# Patient Record
Sex: MALE | Race: WHITE | NOT HISPANIC OR LATINO | URBAN - METROPOLITAN AREA
[De-identification: names, ages, dates, MRNs, and addresses within clinical notes are randomized per-mention and may not be internally consistent; named-entity substitution may affect disease eponyms.]

---

## 2018-12-08 ENCOUNTER — INPATIENT (INPATIENT)
Facility: HOSPITAL | Age: 73
LOS: 1 days | Discharge: HOME | DRG: 311 | End: 2018-12-10
Attending: HOSPITALIST | Admitting: HOSPITALIST
Payer: MEDICARE

## 2018-12-08 VITALS
WEIGHT: 184.97 LBS | RESPIRATION RATE: 18 BRPM | TEMPERATURE: 98 F | HEIGHT: 63 IN | SYSTOLIC BLOOD PRESSURE: 148 MMHG | OXYGEN SATURATION: 97 % | HEART RATE: 79 BPM | DIASTOLIC BLOOD PRESSURE: 85 MMHG

## 2018-12-08 DIAGNOSIS — Z29.9 ENCOUNTER FOR PROPHYLACTIC MEASURES, UNSPECIFIED: ICD-10-CM

## 2018-12-08 DIAGNOSIS — I24.9 ACUTE ISCHEMIC HEART DISEASE, UNSPECIFIED: ICD-10-CM

## 2018-12-08 DIAGNOSIS — K21.9 GASTRO-ESOPHAGEAL REFLUX DISEASE WITHOUT ESOPHAGITIS: ICD-10-CM

## 2018-12-08 DIAGNOSIS — I10 ESSENTIAL (PRIMARY) HYPERTENSION: ICD-10-CM

## 2018-12-08 DIAGNOSIS — R07.9 CHEST PAIN, UNSPECIFIED: ICD-10-CM

## 2018-12-08 LAB
ALBUMIN SERPL ELPH-MCNC: 3.9 G/DL — SIGNIFICANT CHANGE UP (ref 3.5–5)
ALP SERPL-CCNC: 62 U/L — SIGNIFICANT CHANGE UP (ref 40–120)
ALT FLD-CCNC: 29 U/L DA — SIGNIFICANT CHANGE UP (ref 10–60)
ANION GAP SERPL CALC-SCNC: 7 MMOL/L — SIGNIFICANT CHANGE UP (ref 5–17)
APTT BLD: 30 SEC — SIGNIFICANT CHANGE UP (ref 27.5–36.3)
AST SERPL-CCNC: 19 U/L — SIGNIFICANT CHANGE UP (ref 10–40)
BASOPHILS # BLD AUTO: 0.1 K/UL — SIGNIFICANT CHANGE UP (ref 0–0.2)
BASOPHILS NFR BLD AUTO: 0.7 % — SIGNIFICANT CHANGE UP (ref 0–2)
BILIRUB SERPL-MCNC: 0.8 MG/DL — SIGNIFICANT CHANGE UP (ref 0.2–1.2)
BUN SERPL-MCNC: 19 MG/DL — HIGH (ref 7–18)
CALCIUM SERPL-MCNC: 8.9 MG/DL — SIGNIFICANT CHANGE UP (ref 8.4–10.5)
CHLORIDE SERPL-SCNC: 102 MMOL/L — SIGNIFICANT CHANGE UP (ref 96–108)
CK MB BLD-MCNC: 1.7 % — SIGNIFICANT CHANGE UP (ref 0–3.5)
CK MB CFR SERPL CALC: 1.1 NG/ML — SIGNIFICANT CHANGE UP (ref 0–3.6)
CK SERPL-CCNC: 65 U/L — SIGNIFICANT CHANGE UP (ref 35–232)
CO2 SERPL-SCNC: 26 MMOL/L — SIGNIFICANT CHANGE UP (ref 22–31)
CREAT SERPL-MCNC: 0.74 MG/DL — SIGNIFICANT CHANGE UP (ref 0.5–1.3)
D DIMER BLD IA.RAPID-MCNC: 190 NG/ML DDU — SIGNIFICANT CHANGE UP
EOSINOPHIL # BLD AUTO: 0 K/UL — SIGNIFICANT CHANGE UP (ref 0–0.5)
EOSINOPHIL NFR BLD AUTO: 0.2 % — SIGNIFICANT CHANGE UP (ref 0–6)
GLUCOSE SERPL-MCNC: 94 MG/DL — SIGNIFICANT CHANGE UP (ref 70–99)
HCT VFR BLD CALC: 52.6 % — HIGH (ref 39–50)
HGB BLD-MCNC: 16.4 G/DL — SIGNIFICANT CHANGE UP (ref 13–17)
INR BLD: 1 RATIO — SIGNIFICANT CHANGE UP (ref 0.88–1.16)
LYMPHOCYTES # BLD AUTO: 1.1 K/UL — SIGNIFICANT CHANGE UP (ref 1–3.3)
LYMPHOCYTES # BLD AUTO: 12.5 % — LOW (ref 13–44)
MCHC RBC-ENTMCNC: 29.1 PG — SIGNIFICANT CHANGE UP (ref 27–34)
MCHC RBC-ENTMCNC: 31.2 GM/DL — LOW (ref 32–36)
MCV RBC AUTO: 93.1 FL — SIGNIFICANT CHANGE UP (ref 80–100)
MONOCYTES # BLD AUTO: 0.5 K/UL — SIGNIFICANT CHANGE UP (ref 0–0.9)
MONOCYTES NFR BLD AUTO: 5.9 % — SIGNIFICANT CHANGE UP (ref 2–14)
NEUTROPHILS # BLD AUTO: 7.3 K/UL — SIGNIFICANT CHANGE UP (ref 1.8–7.4)
NEUTROPHILS NFR BLD AUTO: 80.6 % — HIGH (ref 43–77)
PLATELET # BLD AUTO: 213 K/UL — SIGNIFICANT CHANGE UP (ref 150–400)
POTASSIUM SERPL-MCNC: 4.3 MMOL/L — SIGNIFICANT CHANGE UP (ref 3.5–5.3)
POTASSIUM SERPL-SCNC: 4.3 MMOL/L — SIGNIFICANT CHANGE UP (ref 3.5–5.3)
PROT SERPL-MCNC: 8.3 G/DL — SIGNIFICANT CHANGE UP (ref 6–8.3)
PROTHROM AB SERPL-ACNC: 11.1 SEC — SIGNIFICANT CHANGE UP (ref 10–12.9)
RBC # BLD: 5.65 M/UL — SIGNIFICANT CHANGE UP (ref 4.2–5.8)
RBC # FLD: 12.7 % — SIGNIFICANT CHANGE UP (ref 10.3–14.5)
SODIUM SERPL-SCNC: 135 MMOL/L — SIGNIFICANT CHANGE UP (ref 135–145)
TROPONIN I SERPL-MCNC: <0.015 NG/ML — SIGNIFICANT CHANGE UP (ref 0–0.04)
TROPONIN I SERPL-MCNC: <0.015 NG/ML — SIGNIFICANT CHANGE UP (ref 0–0.04)
WBC # BLD: 9.1 K/UL — SIGNIFICANT CHANGE UP (ref 3.8–10.5)
WBC # FLD AUTO: 9.1 K/UL — SIGNIFICANT CHANGE UP (ref 3.8–10.5)

## 2018-12-08 PROCEDURE — 71045 X-RAY EXAM CHEST 1 VIEW: CPT | Mod: 26

## 2018-12-08 PROCEDURE — 99285 EMERGENCY DEPT VISIT HI MDM: CPT

## 2018-12-08 RX ORDER — DOCUSATE SODIUM 100 MG
100 CAPSULE ORAL
Qty: 0 | Refills: 0 | Status: DISCONTINUED | OUTPATIENT
Start: 2018-12-08 | End: 2018-12-10

## 2018-12-08 RX ORDER — METOPROLOL TARTRATE 50 MG
12.5 TABLET ORAL
Qty: 0 | Refills: 0 | Status: DISCONTINUED | OUTPATIENT
Start: 2018-12-08 | End: 2018-12-10

## 2018-12-08 RX ORDER — PANTOPRAZOLE SODIUM 20 MG/1
40 TABLET, DELAYED RELEASE ORAL
Qty: 0 | Refills: 0 | Status: DISCONTINUED | OUTPATIENT
Start: 2018-12-08 | End: 2018-12-10

## 2018-12-08 RX ORDER — SENNA PLUS 8.6 MG/1
2 TABLET ORAL AT BEDTIME
Qty: 0 | Refills: 0 | Status: DISCONTINUED | OUTPATIENT
Start: 2018-12-08 | End: 2018-12-10

## 2018-12-08 RX ORDER — ENOXAPARIN SODIUM 100 MG/ML
40 INJECTION SUBCUTANEOUS DAILY
Qty: 0 | Refills: 0 | Status: DISCONTINUED | OUTPATIENT
Start: 2018-12-08 | End: 2018-12-10

## 2018-12-08 RX ORDER — SIMVASTATIN 20 MG/1
40 TABLET, FILM COATED ORAL AT BEDTIME
Qty: 0 | Refills: 0 | Status: DISCONTINUED | OUTPATIENT
Start: 2018-12-08 | End: 2018-12-10

## 2018-12-08 RX ORDER — SODIUM CHLORIDE 9 MG/ML
3 INJECTION INTRAMUSCULAR; INTRAVENOUS; SUBCUTANEOUS ONCE
Qty: 0 | Refills: 0 | Status: COMPLETED | OUTPATIENT
Start: 2018-12-08 | End: 2018-12-08

## 2018-12-08 RX ORDER — ASPIRIN/CALCIUM CARB/MAGNESIUM 324 MG
81 TABLET ORAL DAILY
Qty: 0 | Refills: 0 | Status: DISCONTINUED | OUTPATIENT
Start: 2018-12-08 | End: 2018-12-10

## 2018-12-08 RX ORDER — LOSARTAN POTASSIUM 100 MG/1
100 TABLET, FILM COATED ORAL DAILY
Qty: 0 | Refills: 0 | Status: DISCONTINUED | OUTPATIENT
Start: 2018-12-08 | End: 2018-12-10

## 2018-12-08 RX ADMIN — SODIUM CHLORIDE 3 MILLILITER(S): 9 INJECTION INTRAMUSCULAR; INTRAVENOUS; SUBCUTANEOUS at 09:29

## 2018-12-08 RX ADMIN — Medication 81 MILLIGRAM(S): at 22:05

## 2018-12-08 RX ADMIN — ENOXAPARIN SODIUM 40 MILLIGRAM(S): 100 INJECTION SUBCUTANEOUS at 22:02

## 2018-12-08 RX ADMIN — SIMVASTATIN 40 MILLIGRAM(S): 20 TABLET, FILM COATED ORAL at 22:05

## 2018-12-08 RX ADMIN — Medication 100 MILLIGRAM(S): at 17:22

## 2018-12-08 RX ADMIN — PANTOPRAZOLE SODIUM 40 MILLIGRAM(S): 20 TABLET, DELAYED RELEASE ORAL at 22:05

## 2018-12-08 RX ADMIN — Medication 12.5 MILLIGRAM(S): at 17:24

## 2018-12-08 RX ADMIN — SENNA PLUS 2 TABLET(S): 8.6 TABLET ORAL at 22:06

## 2018-12-08 NOTE — H&P ADULT - PROBLEM SELECTOR PLAN 4
Improve VTE score: 2 (Lovenox sq)  [ ] Previous VTE                                               3  [ ] Thrombophilia                                             2  [ ] Lower limb paralysis                                   2    [ ] Current Cancer                                           2   [x] Immobilization > 24 hrs                              1  [ ] ICU/CCU stay > 24 hours                            1  [x] Age > 60                                                       1

## 2018-12-08 NOTE — ED PROVIDER NOTE - MEDICAL DECISION MAKING DETAILS
72 y/o patient with Chest pain, pain with deep breathing and movement. EKG shows no ischemia. Will check labs and likely admit.

## 2018-12-08 NOTE — ED ADULT NURSE NOTE - LANGUAGE ASSISTANCE NEEDED
No-Patient/Caregiver offered and refused free interpretation services./primary nurse [place]an speaking

## 2018-12-08 NOTE — H&P ADULT - NSHPLABSRESULTS_GEN_ALL_CORE
16.4   9.1   )-----------( 213      ( 08 Dec 2018 09:34 )             52.6       12-08    135  |  102  |  19<H>  ----------------------------<  94  4.3   |  26  |  0.74    Ca    8.9      08 Dec 2018 09:34    TPro  8.3  /  Alb  3.9  /  TBili  0.8  /  DBili  x   /  AST  19  /  ALT  29  /  AlkPhos  62  12-08

## 2018-12-08 NOTE — H&P ADULT - PROBLEM SELECTOR PLAN 1
Left sided chest pain on deep breathing  DAMON score 3  D-Dimer: Normal  EKG: Normal sinus rhythm  F/u Echo  Started ASA, Statin and B-blocker Left sided chest pain on deep breathing  DAMON score 3  D-Dimer and Troponin: Normal  EKG: Normal sinus rhythm  F/u Echo  Trend cardiac enzymes  Started ASA, Statin and B-blocker Left sided chest pain on deep breathing  DAMON score 3  D-Dimer and Troponin: Normal  EKG: Normal sinus rhythm  F/u Echo  Trend cardiac enzymes  Started ASA, Statin and B-blocker  Consulted Dr Boyd and likely stress test on Monday

## 2018-12-08 NOTE — DISCHARGE NOTE ADULT - HOSPITAL COURSE
HPI: 73 Male with PMH of HTN, GERD and BPH came to hospital for Left sided chest pain. Pt has been having this pain for few months but got severe last night. Pain is moderate, sharp, needle like, gets exacerbated with deep breaths. No radiation to neck or arm, denies any localized chest tenderness. Pt last had echocardiogram 2 years ago which was normal. Colonoscopy 2 years ago showed benign polyps which were removed. Pt denies fever, cough, headache, abdominal distention, problems with urine or bowel.   SH: Drinks socially, No smoking  ED Course: Hemodynamically stable. Labs were unremarkable. Normal cardiac enzymes, D-dimer and chest X-ray. EKG was normal sinus without ischemic changes. (08 Dec 2018 12:39)    Pt was admitted for ACS in view of his chest pain. Cardiac enzymes, EKG, D-dimer was normal. Pt was started on aspirin, statin and b-blocker. Pt continued his HTN and GERD meds. Pt is stable for discharge as per Attending and follow up with his cardiologist after discharge. HPI: 73 Male with PMH of HTN, GERD and BPH came to hospital for Left sided chest pain. Pt has been having this pain for few months but got severe last night. Pain is moderate, sharp, needle like, gets exacerbated with deep breaths. No radiation to neck or arm, denies any localized chest tenderness. Pt last had echocardiogram 2 years ago which was normal. Colonoscopy 2 years ago showed benign polyps which were removed. Pt denies fever, cough, headache, abdominal distention, problems with urine or bowel.   SH: Drinks socially, No smoking  ED Course: Hemodynamically stable. Labs were unremarkable. Normal cardiac enzymes, D-dimer and chest X-ray. EKG was normal sinus without ischemic changes.     Pt was admitted for ACS in view of his chest pain. Cardiac enzymes, EKG, D-dimer was normal. Pt was started on aspirin, statin and b-blocker. Pt continued his HTN and GERD meds. Pt is stable for discharge as per Attending and follow up with his cardiologist after discharge. HPI: 73 Male with PMH of HTN, GERD and BPH came to hospital for Left sided chest pain. Pt has been having this pain for few months but got severe last night. Pain is moderate, sharp, needle like, gets exacerbated with deep breaths. No radiation to neck or arm, denies any localized chest tenderness. Pt last had echocardiogram 2 years ago which was normal. Colonoscopy 2 years ago showed benign polyps which were removed. Pt denies fever, cough, headache, abdominal distention, problems with urine or bowel.   SH: Drinks socially, No smoking  ED Course: Hemodynamically stable. Labs were unremarkable. Normal cardiac enzymes, D-dimer and chest X-ray. EKG was normal sinus without ischemic changes.   Stress test was done and results are pending at 4:35 PM.    Pt was admitted for ACS in view of his chest pain. Cardiac enzymes, EKG, D-dimer was normal. Pt was started on aspirin, statin and b-blocker. Script was sent to pharmacy./ Pt continued his HTN and GERD meds.   Pt is stable for discharge as per Attending and follow up with his cardiologist after discharge. HPI: 73 Male with PMH of HTN, GERD and BPH came to hospital for Left sided chest pain. Pt has been having this pain for few months but got severe last night. Pain is moderate, sharp, needle like, gets exacerbated with deep breaths. No radiation to neck or arm, denies any localized chest tenderness. Pt last had echocardiogram 2 years ago which was normal. Colonoscopy 2 years ago showed benign polyps which were removed. Pt denies fever, cough, headache, abdominal distention, problems with urine or bowel.   SH: Drinks socially, No smoking  ED Course: Hemodynamically stable. Labs were unremarkable. Normal cardiac enzymes, D-dimer and chest X-ray. EKG was normal sinus without ischemic changes.   Stress test was done.  Stress test is negative for ischemia    Pt was admitted for ACS in view of his chest pain. Cardiac enzymes, EKG, D-dimer was normal. Pt was started on aspirin, statin and b-blocker. Script was sent to pharmacy./ Pt continued his HTN and GERD meds.   Pt is stable for discharge as per Attending and follow up with his cardiologist after discharge.   Pt to f/u with Dr Norwood HPI: 73 Male with PMH of HTN, GERD and BPH came to hospital for Left sided chest pain. Pt has been having this pain for few months but got severe last night. Pain is moderate, sharp, needle like, gets exacerbated with deep breaths. No radiation to neck or arm, denies any localized chest tenderness. Pt last had echocardiogram 2 years ago which was normal. Colonoscopy 2 years ago showed benign polyps which were removed. Pt denies fever, cough, headache, abdominal distention, problems with urine or bowel.   SH: Drinks socially, No smoking  ED Course: Hemodynamically stable. Labs were unremarkable. Normal cardiac enzymes, D-dimer and chest X-ray. EKG was normal sinus without ischemic changes.   Stress test was done.  Stress test is negative for ischemia    Pt was admitted for chest pain. Cardiac enzymes, EKG, D-dimer was normal. Pt was started on aspirin, statin and b-blocker. Script was sent to pharmacy./ Pt continued his HTN and GERD meds.   Pt is stable for discharge as per Attending and follow up with his cardiologist after discharge.   Pt to f/u with Dr Norwood

## 2018-12-08 NOTE — DISCHARGE NOTE ADULT - CARE PLAN
Principal Discharge DX:	ACS (acute coronary syndrome)  Goal:	Prevent cardiac arrest  Assessment and plan of treatment:	Pt was admitted for ACS in view of his chest pain. Cardiac enzymes, EKG, D-dimer was normal. Pt was started on aspirin, statin and b-blocker. Pt will follow up with his cardiologist after discharge. Principal Discharge DX:	ACS (acute coronary syndrome)  Goal:	Prevent cardiac arrest  Assessment and plan of treatment:	Pt was admitted for ACS in view of his chest pain. Cardiac enzymes, EKG, D-dimer was normal. Pt was started on aspirin, statin and b-blocker. Completed  a stress test today. Pt will follow up with his cardiologist after discharge Principal Discharge DX:	ACS (acute coronary syndrome)  Goal:	Prevent cardiac arrest  Assessment and plan of treatment:	Pt was admitted for ACS in view of his chest pain. Cardiac enzymes, EKG, D-dimer was normal.  You completed a stress test today. You  will follow up with your cardiologist after discharge.

## 2018-12-08 NOTE — DISCHARGE NOTE ADULT - PLAN OF CARE
Prevent cardiac arrest Pt was admitted for ACS in view of his chest pain. Cardiac enzymes, EKG, D-dimer was normal. Pt was started on aspirin, statin and b-blocker. Pt will follow up with his cardiologist after discharge. Pt was admitted for ACS in view of his chest pain. Cardiac enzymes, EKG, D-dimer was normal. Pt was started on aspirin, statin and b-blocker. Completed  a stress test today. Pt will follow up with his cardiologist after discharge Pt was admitted for ACS in view of his chest pain. Cardiac enzymes, EKG, D-dimer was normal.  You completed a stress test today. You  will follow up with your cardiologist after discharge.

## 2018-12-08 NOTE — H&P ADULT - ASSESSMENT
73 Male with PMH of HTN, GERD and BPH came to hospital for Left sided chest pain. Admitted to Tele for ACS.     Primary team to call Pharmacy on Weekday to get complete meds. 73 Male with PMH of HTN, GERD and BPH came to hospital for Left sided chest pain. Admitted to Tele for ACS.

## 2018-12-08 NOTE — ED PROVIDER NOTE - PROGRESS NOTE DETAILS
Patient resting comfortably, feels moderately improved. PMD: Zafar Marti. Will check d-dimer. If positive, will get CTA chest. If d-dimer or CTA negative, will admit for possible ACS. Called Dr. Marti 3 times in the past hour with no answer. Called hospitalist Dr. Cueva who will admit patient.

## 2018-12-08 NOTE — H&P ADULT - HISTORY OF PRESENT ILLNESS
73 Male with PMH of HTN, GERD and BPH 73 Male with PMH of HTN, GERD and BPH came to hospital for Left sided chest pain. Pt has been having this pain for few months but got severe last night. Pain is moderate, sharp, needle like, gets exacerbated with deep breaths. No radiation to neck or arm, denies any localized chest tenderness. Pt last had echocardiogram 2 years ago which was normal. Colonoscopy 2 years ago benign polyps which were removed. Pt denies fever, cough, headache, abdominal distention, problems with urine or bowel.     SH: Drinks socially, No smoking    ED Course: Hemodynamically stable. Labs were unremarkable. Normal cardiac enzymes, D-dimer and chest X-ray. EKG was normal sinus without ischemic changes. 73 Male with PMH of HTN, GERD and BPH came to hospital for Left sided chest pain. Pt has been having this pain for few months but got severe last night. Pain is moderate, sharp, needle like, gets exacerbated with deep breaths. No radiation to neck or arm, denies any localized chest tenderness. Pt last had echocardiogram 2 years ago which was normal. Colonoscopy 2 years ago showed benign polyps which were removed. Pt denies fever, cough, headache, abdominal distention, problems with urine or bowel.     SH: Drinks socially, No smoking    ED Course: Hemodynamically stable. Labs were unremarkable. Normal cardiac enzymes, D-dimer and chest X-ray. EKG was normal sinus without ischemic changes.

## 2018-12-08 NOTE — DISCHARGE NOTE ADULT - PATIENT PORTAL LINK FT
You can access the [facility] Patient Portal, offered by [facility], by registering with the following website: [email or web address]

## 2018-12-08 NOTE — ED PROVIDER NOTE - OBJECTIVE STATEMENT
74 y/o M patient with a significant PMHx of HTN, HLD and no significant PSHx presents to the ED with his son c/o chest pain since 2:30 am. Patient's son states receiving a phone call at 2:30 am stating that the patient woke up to the pain. Patient reports feeling pain with deep breathes, pain with walking and movement. Patient states sweating. Patient denies any nausea, cardiac problems, or any other complains. NKDA. 72 y/o M patient with a significant PMHx of HTN, HLD and no significant PSHx presents to the ED with his son c/o chest pain since 2:30 am. Patient reports feeling pain with deep breathes, pain with walking and movement. Patient states sweating. Patient denies any nausea, cardiac problems, or any other complains. NKDA.

## 2018-12-08 NOTE — H&P ADULT - NSHPPHYSICALEXAM_GEN_ALL_CORE
Vital Signs Last 24 Hrs  T(C): 36.7 (08 Dec 2018 10:55), Max: 36.7 (08 Dec 2018 10:55)  T(F): 98.1 (08 Dec 2018 10:55), Max: 98.1 (08 Dec 2018 10:55)  HR: 64 (08 Dec 2018 10:55) (64 - 79)  BP: 126/66 (08 Dec 2018 10:55) (126/66 - 148/85)  RR: 16 (08 Dec 2018 10:55) (16 - 18)  SpO2: 100% (08 Dec 2018 10:55) (97% - 100%)

## 2018-12-08 NOTE — DISCHARGE NOTE ADULT - MEDICATION SUMMARY - MEDICATIONS TO TAKE
I will START or STAY ON the medications listed below when I get home from the hospital:    aspirin 81 mg oral delayed release tablet  -- 1 tab(s) by mouth once a day  -- Indication: For Cad    losartan 100 mg oral tablet  -- 1 tab(s) by mouth once a day  -- Indication: For HTN (hypertension)    simvastatin 40 mg oral tablet  -- 1 tab(s) by mouth once a day (at bedtime)  -- Indication: For HLD (hyperlipidemia)    pantoprazole 40 mg oral delayed release tablet  -- 1 tab(s) by mouth once a day (before a meal)  -- Indication: For GERD (gastroesophageal reflux disease) I will START or STAY ON the medications listed below when I get home from the hospital:    aspirin 81 mg oral delayed release tablet  -- 1 tab(s) by mouth once a day  -- Indication: For Cad    losartan 100 mg oral tablet  -- 1 tab(s) by mouth once a day  -- Indication: For HTN (hypertension)    simvastatin 40 mg oral tablet  -- 1 tab(s) by mouth once a day (at bedtime)  -- Indication: For HLD (hyperlipidemia)    metoprolol tartrate 25 mg oral tablet  -- 0.5 tab(s) by mouth 2 times a day   -- It is very important that you take or use this exactly as directed.  Do not skip doses or discontinue unless directed by your doctor.  May cause drowsiness.  Alcohol may intensify this effect.  Use care when operating dangerous machinery.  Some non-prescription drugs may aggravate your condition.  Read all labels carefully.  If a warning appears, check with your doctor before taking.  Take with food or milk.  This drug may impair the ability to drive or operate machinery.  Use care until you become familiar with its effects.    -- Indication: For CAD    pantoprazole 40 mg oral delayed release tablet  -- 1 tab(s) by mouth once a day (before a meal)  -- Indication: For GERD (gastroesophageal reflux disease)

## 2018-12-08 NOTE — H&P ADULT - ATTENDING COMMENTS
Patient was seen and examined by myself. Case was discussed with house staff in details. I have reviewed and agree with the plan as outlined above with edits where appropriate.    Plan discussed with patient and family ( daughter in law ) in details at bedside and all their questions / concerns were addressed.  Vital Signs Last 24 Hrs  T(C): 36.7 (08 Dec 2018 10:55), Max: 36.7 (08 Dec 2018 10:55)  T(F): 98.1 (08 Dec 2018 10:55), Max: 98.1 (08 Dec 2018 10:55)  HR: 64 (08 Dec 2018 10:55) (64 - 79)  BP: 126/66 (08 Dec 2018 10:55) (126/66 - 148/85)  BP(mean): --  RR: 16 (08 Dec 2018 10:55) (16 - 18)  SpO2: 100% (08 Dec 2018 10:55) (97% - 100%) Patient was seen and examined by myself. Case was discussed with house staff in details. I have reviewed and agree with the plan as outlined above with edits where appropriate.    Plan discussed with patient and family ( daughter in law ) in details at bedside and all their questions / concerns were addressed.  Vital Signs Last 24 Hrs  T(C): 36.7 (08 Dec 2018 10:55), Max: 36.7 (08 Dec 2018 10:55)  T(F): 98.1 (08 Dec 2018 10:55), Max: 98.1 (08 Dec 2018 10:55)  HR: 64 (08 Dec 2018 10:55) (64 - 79)  BP: 126/66 (08 Dec 2018 10:55) (126/66 - 148/85)  BP(mean): --  RR: 16 (08 Dec 2018 10:55) (16 - 18)  SpO2: 100% (08 Dec 2018 10:55) (97% - 100%)    Chest pain - symptoms resolving.  Other plan as above.

## 2018-12-08 NOTE — ED ADULT NURSE NOTE - ED STAT RN HANDOFF DETAILS 2
handover report given to Nurse Monroe. patient breathing spontaneously on room air. Remans nursed on cardiac monitor., Is awaiting bed availability.

## 2018-12-09 DIAGNOSIS — R07.9 CHEST PAIN, UNSPECIFIED: ICD-10-CM

## 2018-12-09 LAB
ALBUMIN SERPL ELPH-MCNC: 3.4 G/DL — LOW (ref 3.5–5)
ALP SERPL-CCNC: 51 U/L — SIGNIFICANT CHANGE UP (ref 40–120)
ALT FLD-CCNC: 23 U/L DA — SIGNIFICANT CHANGE UP (ref 10–60)
ANION GAP SERPL CALC-SCNC: 9 MMOL/L — SIGNIFICANT CHANGE UP (ref 5–17)
AST SERPL-CCNC: 16 U/L — SIGNIFICANT CHANGE UP (ref 10–40)
BASOPHILS # BLD AUTO: 0.1 K/UL — SIGNIFICANT CHANGE UP (ref 0–0.2)
BASOPHILS NFR BLD AUTO: 0.8 % — SIGNIFICANT CHANGE UP (ref 0–2)
BILIRUB SERPL-MCNC: 0.7 MG/DL — SIGNIFICANT CHANGE UP (ref 0.2–1.2)
BUN SERPL-MCNC: 20 MG/DL — HIGH (ref 7–18)
CALCIUM SERPL-MCNC: 8.4 MG/DL — SIGNIFICANT CHANGE UP (ref 8.4–10.5)
CHLORIDE SERPL-SCNC: 105 MMOL/L — SIGNIFICANT CHANGE UP (ref 96–108)
CHOLEST SERPL-MCNC: 179 MG/DL — SIGNIFICANT CHANGE UP (ref 10–199)
CK MB BLD-MCNC: <1.9 % — SIGNIFICANT CHANGE UP (ref 0–3.5)
CK MB CFR SERPL CALC: <1 NG/ML — SIGNIFICANT CHANGE UP (ref 0–3.6)
CK SERPL-CCNC: 53 U/L — SIGNIFICANT CHANGE UP (ref 35–232)
CO2 SERPL-SCNC: 27 MMOL/L — SIGNIFICANT CHANGE UP (ref 22–31)
CREAT SERPL-MCNC: 0.7 MG/DL — SIGNIFICANT CHANGE UP (ref 0.5–1.3)
EOSINOPHIL # BLD AUTO: 0.1 K/UL — SIGNIFICANT CHANGE UP (ref 0–0.5)
EOSINOPHIL NFR BLD AUTO: 0.5 % — SIGNIFICANT CHANGE UP (ref 0–6)
FOLATE SERPL-MCNC: 15.3 NG/ML — SIGNIFICANT CHANGE UP
GLUCOSE SERPL-MCNC: 82 MG/DL — SIGNIFICANT CHANGE UP (ref 70–99)
HBA1C BLD-MCNC: 4.9 % — SIGNIFICANT CHANGE UP (ref 4–5.6)
HCT VFR BLD CALC: 50.8 % — HIGH (ref 39–50)
HDLC SERPL-MCNC: 45 MG/DL — SIGNIFICANT CHANGE UP
HGB BLD-MCNC: 15.8 G/DL — SIGNIFICANT CHANGE UP (ref 13–17)
LIPID PNL WITH DIRECT LDL SERPL: 118 MG/DL — SIGNIFICANT CHANGE UP
LYMPHOCYTES # BLD AUTO: 1.5 K/UL — SIGNIFICANT CHANGE UP (ref 1–3.3)
LYMPHOCYTES # BLD AUTO: 15.4 % — SIGNIFICANT CHANGE UP (ref 13–44)
MAGNESIUM SERPL-MCNC: 2 MG/DL — SIGNIFICANT CHANGE UP (ref 1.6–2.6)
MCHC RBC-ENTMCNC: 30.1 PG — SIGNIFICANT CHANGE UP (ref 27–34)
MCHC RBC-ENTMCNC: 31.2 GM/DL — LOW (ref 32–36)
MCV RBC AUTO: 96.5 FL — SIGNIFICANT CHANGE UP (ref 80–100)
MONOCYTES # BLD AUTO: 0.7 K/UL — SIGNIFICANT CHANGE UP (ref 0–0.9)
MONOCYTES NFR BLD AUTO: 6.6 % — SIGNIFICANT CHANGE UP (ref 2–14)
NEUTROPHILS # BLD AUTO: 7.7 K/UL — HIGH (ref 1.8–7.4)
NEUTROPHILS NFR BLD AUTO: 76.8 % — SIGNIFICANT CHANGE UP (ref 43–77)
PHOSPHATE SERPL-MCNC: 3.1 MG/DL — SIGNIFICANT CHANGE UP (ref 2.5–4.5)
PLATELET # BLD AUTO: 177 K/UL — SIGNIFICANT CHANGE UP (ref 150–400)
POTASSIUM SERPL-MCNC: 4.3 MMOL/L — SIGNIFICANT CHANGE UP (ref 3.5–5.3)
POTASSIUM SERPL-SCNC: 4.3 MMOL/L — SIGNIFICANT CHANGE UP (ref 3.5–5.3)
PROT SERPL-MCNC: 7.1 G/DL — SIGNIFICANT CHANGE UP (ref 6–8.3)
RBC # BLD: 5.26 M/UL — SIGNIFICANT CHANGE UP (ref 4.2–5.8)
RBC # FLD: 12.9 % — SIGNIFICANT CHANGE UP (ref 10.3–14.5)
SODIUM SERPL-SCNC: 141 MMOL/L — SIGNIFICANT CHANGE UP (ref 135–145)
TOTAL CHOLESTEROL/HDL RATIO MEASUREMENT: 4 RATIO — SIGNIFICANT CHANGE UP (ref 3.4–9.6)
TRIGL SERPL-MCNC: 81 MG/DL — SIGNIFICANT CHANGE UP (ref 10–149)
TROPONIN I SERPL-MCNC: <0.015 NG/ML — SIGNIFICANT CHANGE UP (ref 0–0.04)
TSH SERPL-MCNC: 1.15 UU/ML — SIGNIFICANT CHANGE UP (ref 0.34–4.82)
VIT B12 SERPL-MCNC: 521 PG/ML — SIGNIFICANT CHANGE UP (ref 232–1245)
WBC # BLD: 10.1 K/UL — SIGNIFICANT CHANGE UP (ref 3.8–10.5)
WBC # FLD AUTO: 10.1 K/UL — SIGNIFICANT CHANGE UP (ref 3.8–10.5)

## 2018-12-09 PROCEDURE — 99233 SBSQ HOSP IP/OBS HIGH 50: CPT | Mod: GC

## 2018-12-09 PROCEDURE — 71250 CT THORAX DX C-: CPT | Mod: 26

## 2018-12-09 RX ORDER — PANTOPRAZOLE SODIUM 20 MG/1
1 TABLET, DELAYED RELEASE ORAL
Qty: 0 | Refills: 0 | COMMUNITY

## 2018-12-09 RX ORDER — LOSARTAN POTASSIUM 100 MG/1
1 TABLET, FILM COATED ORAL
Qty: 0 | Refills: 0 | COMMUNITY

## 2018-12-09 RX ORDER — LOSARTAN POTASSIUM 100 MG/1
1 TABLET, FILM COATED ORAL
Qty: 0 | Refills: 0 | COMMUNITY
Start: 2018-12-09

## 2018-12-09 RX ORDER — ASPIRIN/CALCIUM CARB/MAGNESIUM 324 MG
1 TABLET ORAL
Qty: 0 | Refills: 0 | COMMUNITY

## 2018-12-09 RX ORDER — PANTOPRAZOLE SODIUM 20 MG/1
1 TABLET, DELAYED RELEASE ORAL
Qty: 0 | Refills: 0 | COMMUNITY
Start: 2018-12-09

## 2018-12-09 RX ORDER — ASPIRIN/CALCIUM CARB/MAGNESIUM 324 MG
1 TABLET ORAL
Qty: 0 | Refills: 0 | COMMUNITY
Start: 2018-12-09

## 2018-12-09 RX ORDER — SIMVASTATIN 20 MG/1
1 TABLET, FILM COATED ORAL
Qty: 0 | Refills: 0 | COMMUNITY
Start: 2018-12-09

## 2018-12-09 RX ADMIN — PANTOPRAZOLE SODIUM 40 MILLIGRAM(S): 20 TABLET, DELAYED RELEASE ORAL at 08:51

## 2018-12-09 RX ADMIN — Medication 100 MILLIGRAM(S): at 18:16

## 2018-12-09 RX ADMIN — SENNA PLUS 2 TABLET(S): 8.6 TABLET ORAL at 21:49

## 2018-12-09 RX ADMIN — Medication 12.5 MILLIGRAM(S): at 18:16

## 2018-12-09 RX ADMIN — SIMVASTATIN 40 MILLIGRAM(S): 20 TABLET, FILM COATED ORAL at 21:49

## 2018-12-09 RX ADMIN — Medication 100 MILLIGRAM(S): at 05:47

## 2018-12-09 RX ADMIN — ENOXAPARIN SODIUM 40 MILLIGRAM(S): 100 INJECTION SUBCUTANEOUS at 11:39

## 2018-12-09 RX ADMIN — Medication 81 MILLIGRAM(S): at 11:39

## 2018-12-09 NOTE — CHART NOTE - NSCHARTNOTEFT_GEN_A_CORE
Advised by Dr Duvall to obtain an stress test and echocardiogram  HPI:  73 Male with PMH of HTN, GERD and BPH came to hospital for Left sided chest pain. Pt has been having this pain for few months but got severe last night. Pain is moderate, sharp, needle like, gets exacerbated with deep breaths. No radiation to neck or arm, denies any localized chest tenderness. Pt last had echocardiogram 2 years ago which was normal. Colonoscopy 2 years ago showed benign polyps which were removed. Pt denies fever, cough, headache, abdominal distention, problems with urine or bowel.     SH: Drinks socially, No smoking    ED Course: Hemodynamically stable. Labs were unremarkable. Normal cardiac enzymes, D-dimer and chest X-ray. EKG was normal sinus without ischemic changes. (08 Dec 2018 12:39)  Vital Signs Last 24 Hrs  T(C): 36.7 (09 Dec 2018 10:51), Max: 36.8 (08 Dec 2018 16:13)  T(F): 98 (09 Dec 2018 10:51), Max: 98.2 (08 Dec 2018 16:13)  HR: 70 (09 Dec 2018 10:51) (67 - 73)  BP: 126/97 (09 Dec 2018 10:51) (110/54 - 126/97)  BP(mean): --  RR: 18 (09 Dec 2018 10:51) (16 - 18)  SpO2: 96% (09 Dec 2018 10:51) (96% - 98%)  A/p 73 yrs old male with HTN and GERD now complained of left sided chest pain   CT chest   Stress test as per Dr Boyd

## 2018-12-09 NOTE — PROGRESS NOTE ADULT - SUBJECTIVE AND OBJECTIVE BOX
MEDICAL ATTENDING NOTE    Patient is a 73y old  Male who presents with a chief complaint of Chest pain (08 Dec 2018 17:47)      INTERVAL HPI/OVERNIGHT EVENTS: no new complaints    MEDICATIONS  (STANDING):  aspirin enteric coated 81 milliGRAM(s) Oral daily  docusate sodium 100 milliGRAM(s) Oral two times a day  enoxaparin Injectable 40 milliGRAM(s) SubCutaneous daily  losartan 100 milliGRAM(s) Oral daily  metoprolol tartrate 12.5 milliGRAM(s) Oral two times a day  pantoprazole    Tablet 40 milliGRAM(s) Oral before breakfast  senna 2 Tablet(s) Oral at bedtime  simvastatin 40 milliGRAM(s) Oral at bedtime    MEDICATIONS  (PRN):      __________________________________________________  ----------------------------------------------------------------------------------  REVIEW OF SYSTEMS: no fever, no SOB, No Chest pain; feels well      Vital Signs Last 24 Hrs  T(C): 36.2 (09 Dec 2018 15:46), Max: 36.7 (09 Dec 2018 05:04)  T(F): 97.2 (09 Dec 2018 15:46), Max: 98.1 (09 Dec 2018 05:04)  HR: 69 (09 Dec 2018 15:46) (67 - 73)  BP: 140/77 (09 Dec 2018 15:46) (110/54 - 140/77)  BP(mean): --  RR: 18 (09 Dec 2018 15:46) (16 - 18)  SpO2: 97% (09 Dec 2018 15:46) (96% - 98%)    _________________  PHYSICAL EXAM:  ---------------------------   NAD; Normocephalic;   LUNGS - no wheezing  HEART: S1 S2+   ABDOMEN: Soft, Nontender, non distended  EXTREMITIES: no cyanosis; no edema  NERVOUS SYSTEM:  Awake and alert; no new deficits    _________________________________________________  LABS:                        15.8   10.1  )-----------( 177      ( 09 Dec 2018 05:59 )             50.8     12-09    141  |  105  |  20<H>  ----------------------------<  82  4.3   |  27  |  0.70    Ca    8.4      09 Dec 2018 05:59  Phos  3.1     12-09  Mg     2.0     12-09    TPro  7.1  /  Alb  3.4<L>  /  TBili  0.7  /  DBili  x   /  AST  16  /  ALT  23  /  AlkPhos  51  12-09    PT/INR - ( 08 Dec 2018 11:22 )   PT: 11.1 sec;   INR: 1.00 ratio         PTT - ( 08 Dec 2018 11:22 )  PTT:30.0 sec    CAPILLARY BLOOD GLUCOSE                Care Discussed with Consultants :     Plan of care was discussed with patient ; all questions and concerns were addressed and care was aligned with patient's wishes.  Patient has been advised to follow up with PMD upon discharge from the hospital.  Discharge plans discussed with nursing staff , case manger and . MEDICAL ATTENDING NOTE    Patient is a 73y old  Male who presents with a chief complaint of Chest pain (08 Dec 2018 17:47)      INTERVAL HPI/OVERNIGHT EVENTS: left sided chest pain with cough otherwise no new complaints    MEDICATIONS  (STANDING):  aspirin enteric coated 81 milliGRAM(s) Oral daily  docusate sodium 100 milliGRAM(s) Oral two times a day  enoxaparin Injectable 40 milliGRAM(s) SubCutaneous daily  losartan 100 milliGRAM(s) Oral daily  metoprolol tartrate 12.5 milliGRAM(s) Oral two times a day  pantoprazole    Tablet 40 milliGRAM(s) Oral before breakfast  senna 2 Tablet(s) Oral at bedtime  simvastatin 40 milliGRAM(s) Oral at bedtime    MEDICATIONS  (PRN):      __________________________________________________  ----------------------------------------------------------------------------------  REVIEW OF SYSTEMS: no fever, no SOB, No Chest pain; feels well      Vital Signs Last 24 Hrs  T(C): 36.2 (09 Dec 2018 15:46), Max: 36.7 (09 Dec 2018 05:04)  T(F): 97.2 (09 Dec 2018 15:46), Max: 98.1 (09 Dec 2018 05:04)  HR: 69 (09 Dec 2018 15:46) (67 - 73)  BP: 140/77 (09 Dec 2018 15:46) (110/54 - 140/77)  BP(mean): --  RR: 18 (09 Dec 2018 15:46) (16 - 18)  SpO2: 97% (09 Dec 2018 15:46) (96% - 98%)    _________________  PHYSICAL EXAM:  ---------------------------   NAD; Normocephalic;   LUNGS - no wheezing  HEART: S1 S2+   ABDOMEN: Soft, Nontender, non distended; BS+  EXTREMITIES: no cyanosis; no edema  NERVOUS SYSTEM:  Awake and alert; no new deficits    _________________________________________________  LABS:                        15.8   10.1  )-----------( 177      ( 09 Dec 2018 05:59 )             50.8     12-09    141  |  105  |  20<H>  ----------------------------<  82  4.3   |  27  |  0.70    Ca    8.4      09 Dec 2018 05:59  Phos  3.1     12-09  Mg     2.0     12-09    TPro  7.1  /  Alb  3.4<L>  /  TBili  0.7  /  DBili  x   /  AST  16  /  ALT  23  /  AlkPhos  51  12-09    PT/INR - ( 08 Dec 2018 11:22 )   PT: 11.1 sec;   INR: 1.00 ratio         PTT - ( 08 Dec 2018 11:22 )  PTT:30.0 sec    CAPILLARY BLOOD GLUCOSE      RADIOLOGY Review:   < from: CT Chest No Cont (12.09.18 @ 15:40) >    IMPRESSION:   1. Multiple branching opacities with some associated nodularity in the   left   upper lobe suggestive of areas of mucous plugging and atelectasis. This   could   be inflammatory and/or infectious and clinical correlation is   recommended.     < end of copied text >            Care Discussed with Consultants :     Plan of care was discussed with patient ; all questions and concerns were addressed and care was aligned with patient's wishes.

## 2018-12-09 NOTE — CONSULT NOTE ADULT - SUBJECTIVE AND OBJECTIVE BOX
CHIEF COMPLAINT:Chest pain    HPI-73 Male with PMH of HTN, GERD and BPH came to hospital for Left sided chest pain. Pt has been having this pain for few months but got severe last night. Pain is moderate, sharp, needle like, gets exacerbated with deep breaths. No radiation to neck or arm, denies any localized chest tenderness. Pt last had echocardiogram 2 years ago which was normal. Colonoscopy 2 years ago showed benign polyps which were removed. Pt denies fever, cough, headache, abdominal distention, problems with urine or bowel.     SH: Drinks socially, No smoking    ED Course: Hemodynamically stable. Labs were unremarkable. Normal cardiac enzymes, D-dimer and chest X-ray. EKG was normal sinus without ischemic changes. (08 Dec 2018 12:39)      PAST MEDICAL & SURGICAL HISTORY:  HLD (hyperlipidemia)  HTN (hypertension)  No significant past surgical history      MEDICATIONS  (STANDING):  aspirin enteric coated 81 milliGRAM(s) Oral daily  docusate sodium 100 milliGRAM(s) Oral two times a day  enoxaparin Injectable 40 milliGRAM(s) SubCutaneous daily  losartan 100 milliGRAM(s) Oral daily  metoprolol tartrate 12.5 milliGRAM(s) Oral two times a day  pantoprazole    Tablet 40 milliGRAM(s) Oral before breakfast  senna 2 Tablet(s) Oral at bedtime  simvastatin 40 milliGRAM(s) Oral at bedtime    MEDICATIONS  (PRN):      FAMILY HISTORY:  No pertinent family history in first degree relatives    No family history of premature coronary artery disease or sudden cardiac death    SOCIAL HISTORY:  Smoking-  Alcohol-  Ilicit Drug use-    REVIEW OF SYSTEMS:  Constitutional: [ ] fever, [ ]weight loss, [ ]fatigue Activity [ ] Bedbound,[ ] Ambulates [ ] Unassisted[ ] Cane/Walker [ ] Assistence.  Eyes: [ ] visual changes  Respiratory: [ ]shortness of breath;  [ ] cough, [ ]wheezing, [ ]chills, [ ]hemoptysis  Cardiovascular: [ ] chest pain, [ ]palpitations, [ ]dizziness,  [ ]leg swelling[ ]orthopnea [ ]PND  Gastrointestinal: [ ] abdominal pain, [ ]nausea, [ ]vomiting,  [ ]diarrhea,[ ]constipation  Genitourinary: [ ] dysuria, [ ] hematuria  Neurologic: [ ] headaches [ ] tremors[ ] weakness  Skin: [ ] itching, [ ]burning, [ ] rashes  Endocrine: [ ] heat or cold intolerance  Musculoskeletal: [ ] joint pain or swelling; [ ] muscle, back, or extremity pain  Psychiatric: [ ] depression, [ ]anxiety, [ ]mood swings, or [ ]difficulty sleeping  Hematologic: [ ] easy bruising, [ ] bleeding gums       [ x] All others negative	  [ ] Unable to obtain    Vital Signs Last 24 Hrs  T(C): 36.2 (09 Dec 2018 15:46), Max: 36.7 (09 Dec 2018 05:04)  T(F): 97.2 (09 Dec 2018 15:46), Max: 98.1 (09 Dec 2018 05:04)  HR: 69 (09 Dec 2018 15:46) (67 - 73)  BP: 140/77 (09 Dec 2018 15:46) (110/54 - 140/77)  BP(mean): --  RR: 18 (09 Dec 2018 15:46) (16 - 18)  SpO2: 97% (09 Dec 2018 15:46) (96% - 97%)  I&O's Summary      PHYSICAL EXAM:  General: No acute distress BMI-  HEENT: EOMI, PERRL[ ] Icteric  Neck: Supple, No JVD  Lungs: Equal air entry bilaterally; [ ] Rales [ ] Rhonchi [ ] Wheezing  Heart: Regular rate and rhythm;[ ] Murmurs-   /6 [ ] Systolic [ ] Diastolic [ ] Radiation,No rubs, or gallops  Abdomen: Nontender, bowel sounds present  Extremities: No clubbing, cyanosis, or edema[ ] Calf tenderness  Nervous system:  Alert & Oriented X3, no focal deficits  Psychiatric: Normal affect  Skin: No rashes or lesions      LABS:  12-09    141  |  105  |  20<H>  ----------------------------<  82  4.3   |  27  |  0.70    Ca    8.4      09 Dec 2018 05:59  Phos  3.1     12-09  Mg     2.0     12-09    TPro  7.1  /  Alb  3.4<L>  /  TBili  0.7  /  DBili  x   /  AST  16  /  ALT  23  /  AlkPhos  51  12-09    Creatinine Trend: 0.70<--, 0.74<--                        15.8   10.1  )-----------( 177      ( 09 Dec 2018 05:59 )             50.8     PT/INR - ( 08 Dec 2018 11:22 )   PT: 11.1 sec;   INR: 1.00 ratio         PTT - ( 08 Dec 2018 11:22 )  PTT:30.0 sec    Lipid Panel: Cholesterol, Serum 179  Direct   HDL Cholesterol, Serum 45  Triglycerides, Serum 81    Cardiac Enzymes: CARDIAC MARKERS ( 09 Dec 2018 05:59 )  <0.015 ng/mL / x     / 53 U/L / x     / <1.0 ng/mL  CARDIAC MARKERS ( 08 Dec 2018 14:59 )  <0.015 ng/mL / x     / 65 U/L / x     / 1.1 ng/mL  CARDIAC MARKERS ( 08 Dec 2018 09:34 )  <0.015 ng/mL / x     / x     / x     / x            12-09 [id number] 4.9      RADIOLOGY:    ECG [my interpretation]:    TELEMETRY:    ECHO:    STRESS TEST:    CATHETERIZATION:

## 2018-12-10 VITALS
HEART RATE: 79 BPM | TEMPERATURE: 97 F | SYSTOLIC BLOOD PRESSURE: 130 MMHG | OXYGEN SATURATION: 97 % | RESPIRATION RATE: 16 BRPM | DIASTOLIC BLOOD PRESSURE: 70 MMHG

## 2018-12-10 LAB
HCT VFR BLD CALC: 55 % — HIGH (ref 39–50)
HGB BLD-MCNC: 17.4 G/DL — HIGH (ref 13–17)
MCHC RBC-ENTMCNC: 29.5 PG — SIGNIFICANT CHANGE UP (ref 27–34)
MCHC RBC-ENTMCNC: 31.6 GM/DL — LOW (ref 32–36)
MCV RBC AUTO: 93.2 FL — SIGNIFICANT CHANGE UP (ref 80–100)
PLATELET # BLD AUTO: 220 K/UL — SIGNIFICANT CHANGE UP (ref 150–400)
RBC # BLD: 5.9 M/UL — HIGH (ref 4.2–5.8)
RBC # FLD: 12.8 % — SIGNIFICANT CHANGE UP (ref 10.3–14.5)
WBC # BLD: 8 K/UL — SIGNIFICANT CHANGE UP (ref 3.8–10.5)
WBC # FLD AUTO: 8 K/UL — SIGNIFICANT CHANGE UP (ref 3.8–10.5)

## 2018-12-10 PROCEDURE — 71045 X-RAY EXAM CHEST 1 VIEW: CPT

## 2018-12-10 PROCEDURE — 82553 CREATINE MB FRACTION: CPT

## 2018-12-10 PROCEDURE — 93018 CV STRESS TEST I&R ONLY: CPT

## 2018-12-10 PROCEDURE — 83036 HEMOGLOBIN GLYCOSYLATED A1C: CPT

## 2018-12-10 PROCEDURE — 93306 TTE W/DOPPLER COMPLETE: CPT

## 2018-12-10 PROCEDURE — 83735 ASSAY OF MAGNESIUM: CPT

## 2018-12-10 PROCEDURE — 85027 COMPLETE CBC AUTOMATED: CPT

## 2018-12-10 PROCEDURE — 84100 ASSAY OF PHOSPHORUS: CPT

## 2018-12-10 PROCEDURE — 99285 EMERGENCY DEPT VISIT HI MDM: CPT | Mod: 25

## 2018-12-10 PROCEDURE — A9502: CPT

## 2018-12-10 PROCEDURE — 85730 THROMBOPLASTIN TIME PARTIAL: CPT

## 2018-12-10 PROCEDURE — 82746 ASSAY OF FOLIC ACID SERUM: CPT

## 2018-12-10 PROCEDURE — 99239 HOSP IP/OBS DSCHRG MGMT >30: CPT

## 2018-12-10 PROCEDURE — 78452 HT MUSCLE IMAGE SPECT MULT: CPT

## 2018-12-10 PROCEDURE — 82607 VITAMIN B-12: CPT

## 2018-12-10 PROCEDURE — 93017 CV STRESS TEST TRACING ONLY: CPT

## 2018-12-10 PROCEDURE — 80053 COMPREHEN METABOLIC PANEL: CPT

## 2018-12-10 PROCEDURE — 71250 CT THORAX DX C-: CPT

## 2018-12-10 PROCEDURE — 93016 CV STRESS TEST SUPVJ ONLY: CPT

## 2018-12-10 PROCEDURE — 85379 FIBRIN DEGRADATION QUANT: CPT

## 2018-12-10 PROCEDURE — 82550 ASSAY OF CK (CPK): CPT

## 2018-12-10 PROCEDURE — 84443 ASSAY THYROID STIM HORMONE: CPT

## 2018-12-10 PROCEDURE — 80061 LIPID PANEL: CPT

## 2018-12-10 PROCEDURE — 85610 PROTHROMBIN TIME: CPT

## 2018-12-10 PROCEDURE — 78452 HT MUSCLE IMAGE SPECT MULT: CPT | Mod: 26

## 2018-12-10 PROCEDURE — 93005 ELECTROCARDIOGRAM TRACING: CPT

## 2018-12-10 PROCEDURE — 84484 ASSAY OF TROPONIN QUANT: CPT

## 2018-12-10 RX ORDER — METOPROLOL TARTRATE 50 MG
0.5 TABLET ORAL
Qty: 30 | Refills: 0 | OUTPATIENT
Start: 2018-12-10 | End: 2019-01-08

## 2018-12-10 RX ORDER — FUROSEMIDE 40 MG
20 TABLET ORAL ONCE
Qty: 0 | Refills: 0 | Status: COMPLETED | OUTPATIENT
Start: 2018-12-10 | End: 2018-12-10

## 2018-12-10 RX ADMIN — Medication 12.5 MILLIGRAM(S): at 06:51

## 2018-12-10 RX ADMIN — ENOXAPARIN SODIUM 40 MILLIGRAM(S): 100 INJECTION SUBCUTANEOUS at 13:21

## 2018-12-10 RX ADMIN — Medication 20 MILLIGRAM(S): at 07:21

## 2018-12-10 RX ADMIN — LOSARTAN POTASSIUM 100 MILLIGRAM(S): 100 TABLET, FILM COATED ORAL at 06:51

## 2018-12-10 RX ADMIN — Medication 81 MILLIGRAM(S): at 13:21

## 2018-12-10 RX ADMIN — PANTOPRAZOLE SODIUM 40 MILLIGRAM(S): 20 TABLET, DELAYED RELEASE ORAL at 06:51

## 2018-12-10 RX ADMIN — Medication 100 MILLIGRAM(S): at 06:51

## 2018-12-10 NOTE — CHART NOTE - NSCHARTNOTEFT_GEN_A_CORE
Patient was discharged earlier today.  Called patients pharmacy as unable to prescribe antibiotics via e-prescribe- showing ( non formulary Meds error).  Antibiotics were prescribed for possible respiratory infection  including Pneumonia- Moxifloxacin initially prescribed but per discussion with his pharmacist,  not covered by his insurance so changed to  Levaquin 500 mg-  1 tab daily for 7 days. Repeat Ct chest in 6-8 weeks.  Discussed with Patient's son and primary care give/ HCP

## 2018-12-10 NOTE — PROGRESS NOTE ADULT - PROBLEM SELECTOR PLAN 1
stress test negative  Pain likely due to consolidative changes present on CT chest   Outpatient Pulmonology referral.  will treat for possible PNA with broad spectrum antibiotics and recommend repeat CT chest in 6-8 weeks to follow up for resolution.
Cardiac enzymes negative.  No arrythmia on telemetry  Cardiologist consulted.  CT chest ordered/ done due to pleuritic nature of chest symptoms- Preliminary CT chest report noted above- multiple opacities and mucous plugging noted.  Anticipate stress test in am  Follow up echo cardiogram  Will likely benefit form chest PT  Continue ASA, Statin and B-blocker

## 2018-12-10 NOTE — PROGRESS NOTE ADULT - SUBJECTIVE AND OBJECTIVE BOX
MEDICAL ATTENDING NOTE- LATE ENTRY NOTE    Patient is a 73y old  Male who presents with a chief complaint of Chest pain (09 Dec 2018 18:38)      INTERVAL HPI/OVERNIGHT EVENTS: no new complaints    MEDICATIONS  (STANDING):  aspirin enteric coated 81 milliGRAM(s) Oral daily  docusate sodium 100 milliGRAM(s) Oral two times a day  enoxaparin Injectable 40 milliGRAM(s) SubCutaneous daily  losartan 100 milliGRAM(s) Oral daily  metoprolol tartrate 12.5 milliGRAM(s) Oral two times a day  pantoprazole    Tablet 40 milliGRAM(s) Oral before breakfast  senna 2 Tablet(s) Oral at bedtime  simvastatin 40 milliGRAM(s) Oral at bedtime    MEDICATIONS  (PRN):      __________________________________________________  ----------------------------------------------------------------------------------  REVIEW OF SYSTEMS: no fever, no SOB, No Chest pain; feels well      Vital Signs Last 24 Hrs  T(C): 36.3 (10 Dec 2018 17:32), Max: 36.6 (09 Dec 2018 21:12)  T(F): 97.3 (10 Dec 2018 17:32), Max: 97.9 (09 Dec 2018 21:12)  HR: 79 (10 Dec 2018 17:32) (75 - 88)  BP: 130/70 (10 Dec 2018 17:32) (124/59 - 136/76)  BP(mean): --  RR: 16 (10 Dec 2018 17:32) (16 - 18)  SpO2: 97% (10 Dec 2018 17:32) (95% - 98%)    _________________  PHYSICAL EXAM:  ---------------------------   NAD; Normocephalic;   LUNGS - no wheezing  HEART: S1 S2+   ABDOMEN: Soft, Nontender, non distended  EXTREMITIES: no cyanosis; no edema  NERVOUS SYSTEM:  Awake and alert; no new deficits    _________________________________________________  LABS:                        17.4   8.0   )-----------( 220      ( 10 Dec 2018 12:31 )             55.0     12-09    141  |  105  |  20<H>  ----------------------------<  82  4.3   |  27  |  0.70    Ca    8.4      09 Dec 2018 05:59  Phos  3.1     12-09  Mg     2.0     12-09    TPro  7.1  /  Alb  3.4<L>  /  TBili  0.7  /  DBili  x   /  AST  16  /  ALT  23  /  AlkPhos  51  12-09        CAPILLARY BLOOD GLUCOSE        < from: Nuclear Stress Test-Pharmacologic (12.10.18 @ 06:15) >    IMPRESSIONS:  * There is a small, fixed, mild intensity defect in the  apical to mid inferior wall that thickens, consistent with  attenuation artifact.  * Post-stress resting myocardial perfusion gated SPECT  imaging was performed (LVEF > 70%;LVEDV = 104 ml.)  * Negative study for reversible ischemia    < end of copied text >      RADIOLOGY REVIEW:  < from: CT Chest No Cont (12.09.18 @ 15:40) >  IMPRESSION:   1. Multiple branching opacities with some associated nodularity in the   left   upper lobe suggestive of areas of mucous plugging and atelectasis. This   could   be inflammatory and/or infectious and clinical correlation is   recommended. Please image to resolution.  2. Other findings as described above.    Agree with above report    < end of copied text >          Care Discussed with Consultants :     Plan of care was discussed with patient ; all questions and concerns were addressed and care was aligned with patient's wishes.  Patient has been advised to follow up with PMD upon discharge from the hospital.  Discharge plans discussed with nursing staff and .

## 2018-12-10 NOTE — PROGRESS NOTE ADULT - ATTENDING COMMENTS
Discharged in stable medical condition.
Plan discussed with patient and family( piyush hansen is a dentist ) in details at bedside and all their questions / concerns were addressed

## 2018-12-10 NOTE — PROGRESS NOTE ADULT - ASSESSMENT
73 Male with PMH of HTN, GERD and BPH came to hospital for Left sided chest pain. Admitted to Tele for r/o ACS
73 Male with PMH of HTN, GERD and BPH came to hospital for Left sided chest pain. Admitted to Tele for ACS.

## 2022-01-30 NOTE — ED ADULT NURSE NOTE - CAS EDN DISCHARGE INTERVENTIONS
Orthopedic Progress Note    Patient:  Jackson Cook  YOB: 1944     78 y.o. male    Subjective:  Patient seen and examined. No complaints, concerns or issues overnight. Pain controlled. Denies fever, CP, SOB    Vitals reviewed, afebrile    Objective:   Vitals:    01/29/22 2000   BP: (!) 104/58   Pulse: 70   Resp: 17   Temp:    SpO2: 98%     General: NAD, cooperative   Cardiovascular: Regular rate   Respiratory: Chest symmetric, no accessory muscle use, normal respirations, no audible wheezes  Musculoskeletal  Right lower extremity: TTP about GT. EHL/FHL/TA/GS complex motor intact. Sural, saphenous, superificial/deep peroneal, and plantar nerve distribution SILT. Dorsalis pedis pulse 2+ with BCR. Recent Labs     01/28/22 2228 01/28/22  2228 01/29/22  0644 01/29/22  0644 01/30/22  0600   WBC 6.3   < > 7.0   < > 6.3   HGB 11.3*   < > 11.1*   < > 10.4*   HCT 34.4*   < > 33.9*   < > 32.5*      < > 198   < > 159   INR 1.5  --   --   --   --       < > 137  --   --    K 4.0   < > 3.9  --   --    BUN 20   < > 17  --   --    CREATININE 0.90   < > 0.81  --   --    GLUCOSE 92   < > 101*  --   --     < > = values in this interval not displayed. Meds: No abx/dvt ppx.  See rec for complete list    Impression 78 y.o. male who [address] and sustained    -Left intertrochanteric femur fx    Plan  - Plan for OR today for removal of retrograde femoral nail, poly exchange of left knee, and antegrade femoral nail  - Cleared from primary and ancillary teams  - NPO  - Ancef OCTOR, marked, consented for surgery  - NWB LLE  - Please hold DVT ppx if medically safe    Electronically signed by Power De Souza MD at 7:06 AM 01/30/22 IV intact

## 2022-02-17 NOTE — INPATIENT CERTIFICATION FOR MEDICARE PATIENTS - NS ICMP TWO DAYS INPATIENT
Chart reviewed. Last Colonoscopy on 2/27/2012 with Dr. Abdulkadir Sams. Recommend repeat  10 years. Please call pt to schedule Colonoscopy with Dr. Cassi Colbert. Schedule Procedure:   Please Schedule Routine (next available or patient preference)    Procedure: Colonoscopy (37436) with MD preference for bowel prep. ABNORMAL KIDNEY FUNCTION-NO SUPREP    Diagnosis: Colon Cancer Screening Z12.11     Is patient:  Â· Diabetic?  No  Â· ANTIPLATELET / ANTICOAGULATION: none    Latex allergy: No     Sleep apnea: No     BMI 34.33    Location: Patient Preference    Special Instructions:   MAC Anesthesia    Covid Vaccine: completed  Patient is NOT immunocompromised   COVID 19 Testing Ordered Yes

## 2023-11-17 NOTE — PATIENT PROFILE ADULT - PROVIDER NAME
CONSTITUTIONAL: No fever, weight loss, or fatigue  EYES: No eye pain, visual disturbances, or discharge  ENT:  No difficulty hearing, tinnitus, vertigo; No sinus or throat pain  NECK: No pain or stiffness  RESPIRATORY: No cough, wheezing, chills or hemoptysis; No Shortness of Breath  CARDIOVASCULAR: No chest pain, palpitations, passing out, dizziness, or leg swelling  GASTROINTESTINAL: No abdominal or epigastric pain. No nausea, vomiting, or hematemesis; No diarrhea or constipation. No melena or hematochezia.  GENITOURINARY: No dysuria, frequency, hematuria, or incontinence  NEUROLOGICAL: No headaches, memory loss, loss of strength, numbness, or tremors  SKIN: No itching, burning, rashes, or lesions   Extremity: Left knee pain Zafar Godinez

## 2025-01-19 NOTE — CONSULT NOTE ADULT - CONSULT REQUESTED BY NAME
Dr Meneses
GOAL: Patient will demonstrate an increase in static/dynamic balance in sitting/standing where deficient by at least 1 grade to facilitate greater independence during functional mobility and ADL's in 4 weeks.